# Patient Record
Sex: MALE | Race: WHITE | HISPANIC OR LATINO
[De-identification: names, ages, dates, MRNs, and addresses within clinical notes are randomized per-mention and may not be internally consistent; named-entity substitution may affect disease eponyms.]

---

## 2018-10-29 PROBLEM — Z00.00 ENCOUNTER FOR PREVENTIVE HEALTH EXAMINATION: Status: ACTIVE | Noted: 2018-10-29

## 2018-11-12 ENCOUNTER — APPOINTMENT (OUTPATIENT)
Dept: COLORECTAL SURGERY | Facility: CLINIC | Age: 53
End: 2018-11-12
Payer: COMMERCIAL

## 2018-11-12 VITALS
BODY MASS INDEX: 27.06 KG/M2 | TEMPERATURE: 98.2 F | WEIGHT: 189 LBS | HEART RATE: 68 BPM | DIASTOLIC BLOOD PRESSURE: 83 MMHG | SYSTOLIC BLOOD PRESSURE: 123 MMHG | HEIGHT: 70 IN

## 2018-11-12 PROCEDURE — 46600 DIAGNOSTIC ANOSCOPY SPX: CPT

## 2018-11-12 PROCEDURE — 99213 OFFICE O/P EST LOW 20 MIN: CPT | Mod: 25

## 2018-11-15 LAB
C TRACH RRNA SPEC QL NAA+PROBE: NOT DETECTED
N GONORRHOEA RRNA SPEC QL NAA+PROBE: NOT DETECTED
SOURCE AMPLIFICATION: NORMAL

## 2018-11-19 LAB
CORE LAB NON GYN CYTOLOGY TO LENOX HILL: NORMAL
HHV SPEC CULT: NORMAL

## 2019-11-11 ENCOUNTER — APPOINTMENT (OUTPATIENT)
Dept: COLORECTAL SURGERY | Facility: CLINIC | Age: 54
End: 2019-11-11

## 2019-11-15 ENCOUNTER — APPOINTMENT (OUTPATIENT)
Dept: COLORECTAL SURGERY | Facility: CLINIC | Age: 54
End: 2019-11-15
Payer: COMMERCIAL

## 2019-11-15 VITALS
TEMPERATURE: 97.5 F | DIASTOLIC BLOOD PRESSURE: 76 MMHG | BODY MASS INDEX: 27.63 KG/M2 | HEIGHT: 70 IN | WEIGHT: 193 LBS | HEART RATE: 78 BPM | SYSTOLIC BLOOD PRESSURE: 119 MMHG

## 2019-11-15 DIAGNOSIS — B20 HUMAN IMMUNODEFICIENCY VIRUS [HIV] DISEASE: ICD-10-CM

## 2019-11-15 PROCEDURE — 99213 OFFICE O/P EST LOW 20 MIN: CPT | Mod: 25

## 2019-11-15 PROCEDURE — 46600 DIAGNOSTIC ANOSCOPY SPX: CPT

## 2019-11-15 NOTE — HISTORY OF PRESENT ILLNESS
[FreeTextEntry1] : 53 yo M for follow up anal dysplasia\par \par hx of ASCUS and LSIL pap at [facility], hx of anal chlamydia infection. May have had office based fulguration in the past\par Last seen 11/12/2018, mild/mod int hemorrhoids, herpes/GC/CT swab obtained. Pap LGSIL\par \par \par Reports occasional discomfort from hemorrhoids, however denies at present. \par Denies pain, burning, itching, BPR\par BH: daily, no complaints\par \par HIV (+) on HAART, VL undetectable, CD4 800s. \par MSM, (+) anal receptive sex, denies new sexual partner\par Denies hx of Gardasil vaccine\par \par Colonoscopy recently completed, normal as per patient w/ recommended 5 year follow up\par Denies ASA use, took ibuprofen in the last week for sinus pain

## 2019-11-15 NOTE — PHYSICAL EXAM
[Excoriation] : no perianal excoriation [Normal] : was normal [Fistula] : no fistulas [None] : there was no rectal mass  [de-identified] : Anal pap performed [FreeTextEntry1] : A lighted anoscope was passed into the anal canal and the entire anal mucosal surface was inspected..  THe findings revealed moderate internal hemorrhoids. No masses or lesions were identified.\par \par

## 2019-11-22 LAB — ANAL PAP CYTOLOGY: NORMAL

## 2020-03-06 ENCOUNTER — APPOINTMENT (OUTPATIENT)
Dept: COLORECTAL SURGERY | Facility: CLINIC | Age: 55
End: 2020-03-06
Payer: COMMERCIAL

## 2020-03-06 VITALS
TEMPERATURE: 97.4 F | DIASTOLIC BLOOD PRESSURE: 88 MMHG | HEIGHT: 70 IN | BODY MASS INDEX: 28.35 KG/M2 | SYSTOLIC BLOOD PRESSURE: 139 MMHG | HEART RATE: 89 BPM | WEIGHT: 198 LBS

## 2020-03-06 PROCEDURE — 46600 DIAGNOSTIC ANOSCOPY SPX: CPT

## 2020-03-06 PROCEDURE — 99213 OFFICE O/P EST LOW 20 MIN: CPT | Mod: 25

## 2020-03-06 NOTE — HISTORY OF PRESENT ILLNESS
[FreeTextEntry1] : 55 yo M presents for f/u anal dysplasia\par \par hx of ASCUS and LSIL anal pap at [facility] [place], may have had office based fulguration in the past\par LSIL 11/2018\par Last seen 11/15/2019, hemorrhoids noted, otherwise normal anorectal exam. Anal pap HSIL\par \par Pt presents for 3 month follow up\par Denies pain, itching, BPR or lumps/bumps\par BH: daily, no complaints\par HIV(+) on Descovy/Isentress, VL undetectable, CD4 590s-690s\par Last colonoscopy 11/2019 normal as per patient w/ recommended 5 year follow up\par No ASA use, took Ibuprofen last weekend

## 2020-03-06 NOTE — ASSESSMENT
[FreeTextEntry1] : I have reviewed with the patient the clinical and natural history of human papilloma virus and its relationship to the anus. The risks and associated consequences including sexual transmission, anal warts, anal dysplasia, and the risk of anal cancer have been outlined. The need for long-term surveillance and followup has been detailed. The treatment options including high resolution anoscopy and its associated risk of recurrence and post procedure stricture and pain compared to continued close surveillance and monitoring were reviewed. The patient wishes to proceed with surveillance and management of identified visible/palpable lesions as identified or high grade ( HGSIL) dysplasia identified on cytology.\par \par \par

## 2020-03-06 NOTE — PHYSICAL EXAM
[Excoriation] : no perianal excoriation [Fistula] : no fistulas [Normal] : was normal [None] : there was no rectal mass  [de-identified] : Anal pap performed [FreeTextEntry1] : A lighted anoscope was passed into the anal canal and the entire anal mucosal surface was inspected..  The findings revealed moderate internal hemorrhoids. No masses or lesions were identified.\par \par

## 2020-03-13 LAB — ANAL PAP CYTOLOGY: NORMAL

## 2021-01-08 ENCOUNTER — APPOINTMENT (OUTPATIENT)
Dept: COLORECTAL SURGERY | Facility: CLINIC | Age: 56
End: 2021-01-08

## 2021-02-26 ENCOUNTER — APPOINTMENT (OUTPATIENT)
Dept: COLORECTAL SURGERY | Facility: CLINIC | Age: 56
End: 2021-02-26
Payer: COMMERCIAL

## 2021-02-26 VITALS
HEART RATE: 90 BPM | SYSTOLIC BLOOD PRESSURE: 135 MMHG | TEMPERATURE: 96.9 F | DIASTOLIC BLOOD PRESSURE: 85 MMHG | HEIGHT: 70 IN | BODY MASS INDEX: 28.06 KG/M2 | WEIGHT: 196 LBS

## 2021-02-26 PROCEDURE — 99214 OFFICE O/P EST MOD 30 MIN: CPT | Mod: 25

## 2021-02-26 PROCEDURE — 46600 DIAGNOSTIC ANOSCOPY SPX: CPT

## 2021-02-26 PROCEDURE — 99072 ADDL SUPL MATRL&STAF TM PHE: CPT

## 2021-02-26 NOTE — PHYSICAL EXAM
[Excoriation] : no perianal excoriation [Fistula] : no fistulas [Normal] : was normal [None] : there was no rectal mass  [de-identified] : Anal pap performed [FreeTextEntry1] : A lighted anoscope was passed into the anal canal and the entire anal mucosal surface was inspected..  The findings revealed moderate internal hemorrhoids. No masses or lesions were identified.\par \par

## 2021-02-26 NOTE — HISTORY OF PRESENT ILLNESS
[FreeTextEntry1] : 54 y/o M presents for f/u anal dysplasia\par Last visit was a telehealth visit conducted 4/6/20 for rectal bleeding. Patient was advised conservative management\par Last anal pap smear completed 3/6/20 was rare ASCUS \par \par Underwent surgical resection in December 2020 at [place] for a benign abdominal mass. Patient presented to the ED for abdominal pain, partial resection of the small bowel vs. large intestine\par \par Denies abdominal or rectal pain. Reports sensation of rectal pressure last that is now resolved\par Denies rectal bleeding but admits intermittent itching after BM's. Itching improves when washing the area after BM's\par Denies bumps or lumps\par BH:1-3 time daily\par Denies constipation, diarrhea or straining\par Reports adequate dietary fiber intake. Drinking 3-4 cups of water daily \par \par MSM, (+) anal receptive sex\par HIV (+) on Descovy and Isentress. CD4- 800 , VL- undetectable \par Never received Gardasil vaccine \par Last colonoscopy completed 2019, no abnormal findings per patient\par No ASA/NSAIDs last 7 days

## 2021-02-26 NOTE — ASSESSMENT
[FreeTextEntry1] : I have reviewed with the patient the clinical and natural history of human papilloma virus and its relationship to the anus. The risks and associated consequences including sexual transmission, anal warts, anal dysplasia, and the risk of anal cancer have been outlined. The need for long-term surveillance and followup has been detailed. The treatment options including high resolution anoscopy and its associated risk of recurrence and post procedure stricture and pain compared to continued close surveillance and monitoring were reviewed. The patient wishes to proceed with surveillance and management of identified visible/palpable lesions as identified or high grade ( HGSIL) dysplasia identified on cytology.\par \par \par Obtain old records from recent abdominal surgery.\par

## 2021-03-03 LAB — ANAL PAP CYTOLOGY: NORMAL

## 2021-08-09 ENCOUNTER — APPOINTMENT (OUTPATIENT)
Dept: COLORECTAL SURGERY | Facility: CLINIC | Age: 56
End: 2021-08-09
Payer: COMMERCIAL

## 2021-08-09 VITALS
HEIGHT: 70 IN | WEIGHT: 195 LBS | SYSTOLIC BLOOD PRESSURE: 133 MMHG | DIASTOLIC BLOOD PRESSURE: 86 MMHG | HEART RATE: 74 BPM | TEMPERATURE: 98 F | BODY MASS INDEX: 27.92 KG/M2

## 2021-08-09 PROCEDURE — 99213 OFFICE O/P EST LOW 20 MIN: CPT | Mod: 25

## 2021-08-09 PROCEDURE — 46600 DIAGNOSTIC ANOSCOPY SPX: CPT

## 2021-08-09 NOTE — PHYSICAL EXAM
[Excoriation] : no perianal excoriation [Fistula] : no fistulas [Normal] : was normal [None] : there was no rectal mass  [de-identified] : Anal pap performed [FreeTextEntry1] : Medical assistant was present for the entire exam.\par \par Anoscopy was performed for evaluation of the patients rectal bleeding  history .\par The risks, benefits and alternatives were reviewed.\par \par A lighted anoscope was passed into the anal canal and the entire anal mucosal surface was inspected..  \par The findings revealed moderate internal hemorrhoids.\par No masses or lesions were identified.\par \par

## 2021-08-09 NOTE — HISTORY OF PRESENT ILLNESS
[FreeTextEntry1] : 54 y/o M presents for f/u anal dysplasia\par PSH of diagnostic laparoscopy, ex. lap, drainage of LUQ intraabdominal abscess and en bloc small bowel resection 12/11/20 at [facility], pathology c/w desmoid tumor\par \par \par Last seen in the office on 2/26/21. Moderate internal hemorrhoids noted, no masses or lesions identified. Anal pap smear was LGSIL \par \par Denies abdominal or rectal pain, rectal pain\par Reports severe anorectal itching several weeks ago, evaluated by PCP who performed swabs per patient. Never received results or any medications. Itching has now resolved \par BH:Daily\par Denies constipation or straining. Reports diarrhea earlier in the week \par Reports adequate dietary fiber and water intake \par MSM, (+) anal receptive sex\par HIV (+) on Descovy and Isentress. CD4- 708 , VL- undetectable \par Never received Gardasil vaccine \par Last colonoscopy completed 2019, no abnormal findings per patient\par No ASA/NSAIDs last 7 days

## 2021-08-27 LAB — ANAL PAP CYTOLOGY: NORMAL

## 2021-11-08 ENCOUNTER — APPOINTMENT (OUTPATIENT)
Dept: COLORECTAL SURGERY | Facility: CLINIC | Age: 56
End: 2021-11-08
Payer: COMMERCIAL

## 2021-11-08 VITALS
SYSTOLIC BLOOD PRESSURE: 151 MMHG | TEMPERATURE: 97.6 F | WEIGHT: 202 LBS | HEIGHT: 70 IN | BODY MASS INDEX: 28.92 KG/M2 | HEART RATE: 71 BPM | DIASTOLIC BLOOD PRESSURE: 99 MMHG

## 2021-11-08 PROCEDURE — 46600 DIAGNOSTIC ANOSCOPY SPX: CPT

## 2021-11-08 PROCEDURE — 99214 OFFICE O/P EST MOD 30 MIN: CPT | Mod: 25

## 2021-11-08 NOTE — HISTORY OF PRESENT ILLNESS
[FreeTextEntry1] : 57 yo M presents for f/u anal dysplasia\par PSH of diagnostic laparoscopy, ex. lap, drainage of LUQ intraabdominal abscess and en bloc small bowel resection 12/11/20 at [facility], pathology c/w desmoid tumor\par \par h/o LGSIL anal pap 2/26/21\par Last seen 8/9/21, moderate internal hemorrhoids noted on exam. Anal pap ASCUS cannot exclude HSIL\par \par Pt presents for routine follow up, he denies complaints\par \par BH: twice daily, no complaints\par Denies constipation, diarrhea or straining\par MSM, (+) anal receptive sex\par HIV (+) on Descovy and Isentress. CD4- 500 , VL- undetectable \par Never received Gardasil vaccine\par \par Last colonoscopy completed 2019, no abnormal findings per patient\par No ASA use. Took ibuprofen yesterday for sinus pain\par

## 2021-11-08 NOTE — PHYSICAL EXAM
[Excoriation] : no perianal excoriation [Fistula] : no fistulas [Normal] : was normal [None] : there was no rectal mass  [de-identified] : Anal pap performed [FreeTextEntry1] : Medical assistant was present for the entire exam.\par \par Anoscopy was performed for evaluation of the patients rectal bleeding  history .\par The risks, benefits and alternatives were reviewed.\par \par A lighted anoscope was passed into the anal canal and the entire anal mucosal surface was inspected..  \par The findings revealed moderate internal hemorrhoids.\par No masses or lesions were identified.\par \par

## 2022-05-23 ENCOUNTER — LABORATORY RESULT (OUTPATIENT)
Age: 57
End: 2022-05-23

## 2022-05-23 ENCOUNTER — NON-APPOINTMENT (OUTPATIENT)
Age: 57
End: 2022-05-23

## 2022-05-23 ENCOUNTER — APPOINTMENT (OUTPATIENT)
Dept: COLORECTAL SURGERY | Facility: CLINIC | Age: 57
End: 2022-05-23
Payer: COMMERCIAL

## 2022-05-23 VITALS
TEMPERATURE: 98 F | BODY MASS INDEX: 29.35 KG/M2 | WEIGHT: 205 LBS | HEIGHT: 70 IN | SYSTOLIC BLOOD PRESSURE: 134 MMHG | DIASTOLIC BLOOD PRESSURE: 87 MMHG | HEART RATE: 80 BPM

## 2022-05-23 PROCEDURE — 99214 OFFICE O/P EST MOD 30 MIN: CPT | Mod: 25

## 2022-05-23 PROCEDURE — 46600 DIAGNOSTIC ANOSCOPY SPX: CPT

## 2022-05-23 NOTE — PHYSICAL EXAM
[Excoriation] : no perianal excoriation [Fistula] : no fistulas [Normal] : was normal [None] : there was no rectal mass  [de-identified] : Anal pap performed [FreeTextEntry1] : Medical assistant was present for the entire exam.\par \par Anoscopy was performed for evaluation of the patients rectal bleeding  history .\par The risks, benefits and alternatives were reviewed.\par \par A lighted anoscope was passed into the anal canal and the entire anal mucosal surface was inspected..  \par The findings revealed moderate internal hemorrhoids.\par No masses or lesions were identified.\par \par

## 2022-05-23 NOTE — HISTORY OF PRESENT ILLNESS
[FreeTextEntry1] : 57 y/o M presents for f/u anal dysplasia \par PSH of diagnostic laparoscopy, Ex lap, drainage of LUQ intraabdominal abscess and block small bowel resection 12/11/20 at [facility], pathology c/w desmoid tumor \par \par H/o LGSIL anal pap  2/26/21\par \par Anal Pap ASCUS cannot exclude HSIL 8/9/21. \par \par Last seen in the office 11/8/21: On rectal exam, anal pap performed, no perianal excoriation and no fistulas. The sphincter tone was normal. Moderate internal hemorrhoids on anoscopy. Discussed finidngs and treatment options, will continue to monitor closely pt for HGSIL.  \par \par Anal PAP 11/8/21: ASCUS\par \par \par Pt due for anal pap today.\par \par Pt reports occasional perianal itching, resolves with use of bedet. \par \par \par Pt denies lumps/bumps, discharge, BRBPR. \par \par BH: 2-3x daily, no straining\par Adequate fiber and water intake. \par \par Denies constipation, diarrhea or straining\par MSM, (+) anal receptive sex\par HIV (+) on Descovy and Isentress. CD4- 700s , VL- undetectable - 4/2022\par Never received Gardasil vaccine\par \par Last colonoscopy completed 2019, no abnormal findings per patient. Due for repeat in 2024. \par \par

## 2022-05-27 ENCOUNTER — NON-APPOINTMENT (OUTPATIENT)
Age: 57
End: 2022-05-27

## 2022-05-27 LAB — ANAL PAP CYTOLOGY: NORMAL

## 2022-05-31 ENCOUNTER — NON-APPOINTMENT (OUTPATIENT)
Age: 57
End: 2022-05-31

## 2022-08-24 DIAGNOSIS — K64.8 OTHER HEMORRHOIDS: ICD-10-CM

## 2022-08-24 DIAGNOSIS — K62.5 HEMORRHAGE OF ANUS AND RECTUM: ICD-10-CM

## 2022-08-24 RX ORDER — HYDROCORTISONE 25 MG/G
2.5 CREAM TOPICAL
Qty: 30 | Refills: 0 | Status: ACTIVE | COMMUNITY
Start: 2022-08-24 | End: 1900-01-01

## 2022-10-03 ENCOUNTER — APPOINTMENT (OUTPATIENT)
Dept: COLORECTAL SURGERY | Facility: CLINIC | Age: 57
End: 2022-10-03

## 2022-11-11 ENCOUNTER — APPOINTMENT (OUTPATIENT)
Dept: COLORECTAL SURGERY | Facility: CLINIC | Age: 57
End: 2022-11-11

## 2023-02-03 ENCOUNTER — APPOINTMENT (OUTPATIENT)
Dept: COLORECTAL SURGERY | Facility: CLINIC | Age: 58
End: 2023-02-03
Payer: COMMERCIAL

## 2023-02-03 VITALS
BODY MASS INDEX: 29.63 KG/M2 | WEIGHT: 207 LBS | HEIGHT: 70 IN | SYSTOLIC BLOOD PRESSURE: 144 MMHG | TEMPERATURE: 98.1 F | HEART RATE: 76 BPM | DIASTOLIC BLOOD PRESSURE: 89 MMHG

## 2023-02-03 DIAGNOSIS — L29.0 PRURITUS ANI: ICD-10-CM

## 2023-02-03 PROCEDURE — 99213 OFFICE O/P EST LOW 20 MIN: CPT | Mod: 25

## 2023-02-03 PROCEDURE — 46600 DIAGNOSTIC ANOSCOPY SPX: CPT

## 2023-02-03 NOTE — PHYSICAL EXAM
[Excoriation] : excoriations [Fistula] : no fistulas [Normal] : was normal [None] : there was no rectal mass  [de-identified] : Anal pap performed [de-identified] : Mild superficial fissuring of the right anterior anal margin [FreeTextEntry1] : Medical assistant was present for the entire exam.\par \par Anoscopy was performed for evaluation of the patients rectal bleeding  history .\par The risks, benefits and alternatives were reviewed.\par \par A lighted anoscope was passed into the anal canal and the entire anal mucosal surface was inspected..  \par The findings revealed moderate internal hemorrhoids.\par No masses or lesions were identified.\par \par

## 2023-02-03 NOTE — ASSESSMENT
[FreeTextEntry1] : I have reviewed with the patient the clinical and natural history of human papilloma virus and its relationship to the anus. The risks and associated consequences including sexual transmission, anal warts, anal dysplasia, and the risk of anal cancer have been outlined. The need for long-term surveillance and followup has been detailed. The treatment options including high resolution anoscopy and its associated risk of recurrence and post procedure stricture and pain compared to continued close surveillance and monitoring were reviewed. The patient wishes to proceed with surveillance and management of identified visible/palpable lesions as identified or high grade ( HGSIL) dysplasia identified on cytology.\par \par \par \par Advised the patient of the etiology and treatment of pruritus ani.  Recommendations including appropriate perianal hygiene changes, avoidance of soaps and astringents, lubricating lotions and avoidance of excessive wiping detailed.\par \par Advised increasing fiber regimen.\par \par \par

## 2023-02-03 NOTE — HISTORY OF PRESENT ILLNESS
[FreeTextEntry1] : 58 y/o M presents for follow up of anal dysplasia \par \par MSM, (+) anal receptive sex\par HIV (+) on Descovy and Isentress. CD4- 618 , VL- undetectable - 1/2023\par Never received Gardasil vaccine\par \par PSH of diagnostic laparoscopy Ex lap, drainage of LUQ intraabdominal abscess and block small bowel resection 12/11/20 at [facility], pathology c/w desmoid tumor \par Last colonoscopy completed 2019, no abnormal findings per patient. Due for repeat in 2024. \par \par H/o LGSIL anal pap 2/26/21 followed by ASCUS cannot exclude HSIL (8/9/21), ASCUS 11/2021\par \par Most recently seen in f/u 5/23/22 ,anal pap performed. Exam including anoscopy revealed, no perianal skin excoriations and no fistulas. Sphincter tone was normal. Moderate internal hemorrhoids.  \par Anal pap: Atypical squamous cells of undetermined significance (ASC-US)\par \par In interim, pt contacted this office c/o anal itching and bleeding. He meant to contact PCP office. MICAH Freitas reviewed hygiene and diet and prescribed HC cream in the meantime and advised pt to follow up w/ PCP for routine STI testing. Pt used HC cream and it helped at the time.\par \par In November 2022 pt returned to home country ([place] as his mother passed away. He had constipation and felt anal pain afterwards which resolved after a couple hours.\par \par Admits to occasional itching which improves w/ cleaning. He admits to occasionally scratching the area and is not using any topical creams\par \par Of note, pt had [place] [facility] for f/u small bowel desmoid tumor\par Impression: \par s/p partial jejunal resection w/ anastomosis w/ mild dilatation at anastomotic site w/o discrete mass or recurrence demonstrated\par Denies ASA/NSAID use\par \par \par

## 2023-03-03 LAB — ANAL PAP CYTOLOGY: NORMAL

## 2023-05-20 ENCOUNTER — NON-APPOINTMENT (OUTPATIENT)
Age: 58
End: 2023-05-20

## 2023-08-28 ENCOUNTER — APPOINTMENT (OUTPATIENT)
Dept: COLORECTAL SURGERY | Facility: CLINIC | Age: 58
End: 2023-08-28
Payer: COMMERCIAL

## 2023-08-28 ENCOUNTER — NON-APPOINTMENT (OUTPATIENT)
Age: 58
End: 2023-08-28

## 2023-08-28 VITALS
HEIGHT: 70 IN | WEIGHT: 212 LBS | HEART RATE: 75 BPM | BODY MASS INDEX: 30.35 KG/M2 | SYSTOLIC BLOOD PRESSURE: 126 MMHG | DIASTOLIC BLOOD PRESSURE: 82 MMHG | TEMPERATURE: 98 F

## 2023-08-28 PROCEDURE — 46600 DIAGNOSTIC ANOSCOPY SPX: CPT

## 2023-08-28 PROCEDURE — 99213 OFFICE O/P EST LOW 20 MIN: CPT | Mod: 25

## 2023-08-28 NOTE — HISTORY OF PRESENT ILLNESS
[FreeTextEntry1] : 58 y/o M presents for follow up evaluation of anal dysplasia   MSM, (+) anal receptive sex HIV (+) on Descovy and Isentress. CD4- 618 , VL- undetectable - 1/2023 Never received Gardasil vaccine  PSH of diagnostic laparoscopy Ex lap, drainage of LUQ intraabdominal abscess and block small bowel resection 12/11/20 at [facility], pathology c/w desmoid tumor  Last colonoscopy completed 2019, no abnormal findings per patient. Due for repeat in 2024.  H/o LGSIL anal pap 2/26/21 followed by ASCUS cannot exclude HSIL (8/9/21), ASCUS 11/2021  Seen 5/2022, anal pap ASCUS  Of note, [place] [facility] [place] for f/u small bowel desmoid tumor Impression: s/p partial jejunal resection w/ anastomosis w/ mild dilatation at anastomotic site w/o discrete mass or recurrence demonstrated  Last seen 2/3/2023, c/o itching. excoriations and mild superficial fissuring of right anterior anal margin, no fistula. Moderate internal hemorrhoids noted. Anal pap LSIL  Pt presents for 6 month follow up Reports h/o 2-3 instances of sharp pain in anal area while sitting or laying down. Last occurred 2 months ago. Denies rectal pain, itching or bleeding Moving bowels regularly Denies ASA/NSAID use

## 2023-08-28 NOTE — PHYSICAL EXAM
[Excoriation] : no perianal excoriation [Fistula] : no fistulas [Normal] : was normal [None] : there was no rectal mass  [de-identified] : Anal pap performed [FreeTextEntry1] : Medical assistant was present for the entire exam.\par  \par  Anoscopy was performed for evaluation of the patients rectal bleeding  history .\par  The risks, benefits and alternatives were reviewed.\par  \par  A lighted anoscope was passed into the anal canal and the entire anal mucosal surface was inspected..  \par  The findings revealed moderate internal hemorrhoids.\par  No masses or lesions were identified.\par  \par

## 2023-08-28 NOTE — ASSESSMENT
[FreeTextEntry1] : I have reviewed with the patient the clinical and natural history of human papilloma virus and its relationship to the anus. The risks and associated consequences including sexual transmission, anal warts, anal dysplasia, and the risk of anal cancer have been outlined. The need for long-term surveillance and followup has been detailed. The treatment options including high resolution anoscopy and its associated risk of recurrence and post procedure stricture and pain compared to continued close surveillance and monitoring were reviewed. The patient wishes to proceed with surveillance and management of identified visible/palpable lesions as identified or high grade ( HGSIL) dysplasia identified on cytology.

## 2023-09-22 ENCOUNTER — NON-APPOINTMENT (OUTPATIENT)
Age: 58
End: 2023-09-22

## 2023-09-25 ENCOUNTER — NON-APPOINTMENT (OUTPATIENT)
Age: 58
End: 2023-09-25

## 2023-09-25 LAB — ANAL PAP CYTOLOGY: NORMAL

## 2024-03-04 ENCOUNTER — LABORATORY RESULT (OUTPATIENT)
Age: 59
End: 2024-03-04

## 2024-03-04 ENCOUNTER — APPOINTMENT (OUTPATIENT)
Dept: COLORECTAL SURGERY | Facility: CLINIC | Age: 59
End: 2024-03-04
Payer: COMMERCIAL

## 2024-03-04 VITALS
HEIGHT: 70 IN | SYSTOLIC BLOOD PRESSURE: 119 MMHG | BODY MASS INDEX: 31.77 KG/M2 | WEIGHT: 221.9 LBS | TEMPERATURE: 97.4 F | OXYGEN SATURATION: 97 % | HEART RATE: 63 BPM | DIASTOLIC BLOOD PRESSURE: 82 MMHG

## 2024-03-04 DIAGNOSIS — K62.82 DYSPLASIA OF ANUS: ICD-10-CM

## 2024-03-04 PROCEDURE — 46600 DIAGNOSTIC ANOSCOPY SPX: CPT

## 2024-03-04 PROCEDURE — 99213 OFFICE O/P EST LOW 20 MIN: CPT | Mod: 25

## 2024-03-04 RX ORDER — RALTEGRAVIR 400 MG/1
400 TABLET, FILM COATED ORAL
Refills: 0 | Status: DISCONTINUED | COMMUNITY
End: 2024-03-01

## 2024-03-04 RX ORDER — BICTEGRAVIR SODIUM, EMTRICITABINE, AND TENOFOVIR ALAFENAMIDE FUMARATE 50; 200; 25 MG/1; MG/1; MG/1
50-200-25 TABLET ORAL
Refills: 0 | Status: ACTIVE | COMMUNITY

## 2024-03-04 RX ORDER — EMTRICITABINE AND TENOFOVIR ALAFENAMIDE 120; 15 MG/1; MG/1
TABLET ORAL
Refills: 0 | Status: DISCONTINUED | COMMUNITY
End: 2024-03-01

## 2024-03-04 RX ORDER — LOSARTAN POTASSIUM 50 MG/1
50 TABLET, FILM COATED ORAL
Refills: 0 | Status: ACTIVE | COMMUNITY

## 2024-03-04 NOTE — PHYSICAL EXAM
[Excoriation] : no perianal excoriation [Fistula] : no fistulas [Normal] : was normal [None] : there was no rectal mass  [de-identified] : Anal pap performed [FreeTextEntry1] : Medical assistant was present for the entire exam.\par  \par  Anoscopy was performed for evaluation of the patients rectal bleeding  history .\par  The risks, benefits and alternatives were reviewed.\par  \par  A lighted anoscope was passed into the anal canal and the entire anal mucosal surface was inspected..  \par  The findings revealed moderate internal hemorrhoids.\par  No masses or lesions were identified.\par  \par

## 2024-03-04 NOTE — HISTORY OF PRESENT ILLNESS
[FreeTextEntry1] : 57 y/o M presents for follow up evaluation of anal dysplasia   MSM, (+) anal receptive sex HIV (+), recently switched to Biktarvy (2/2024). CD4 500s, VL undetectable Never received Gardasil vaccine  PSH of diagnostic laparoscopy Ex lap, drainage of LUQ intraabdominal abscess and block small bowel resection 12/11/20 at [facility], pathology c/w desmoid tumor  Last colonoscopy completed 102019 w/ Dr. David Marie, non bleeding hemorrhoids noted, recommended  repeat in 2024.  H/o LGSIL anal pap 2/26/21 followed by ASCUS cannot exclude HSIL (8/9/21), ASCUS 11/2021  Seen 5/2022, anal pap ASCUS  Of note, [place] [facility] f/u small bowel desmoid tumor Impression: s/p partial jejunal resection w/ anastomosis w/ mild dilatation at anastomotic site w/o discrete mass or recurrence demonstrated  Seen 2/3/2023, c/o itching. Excoriations and mild superficial fissuring of right anterior anal margin, no fistula. Moderate internal hemorrhoids noted. Anal pap LSIL  Most recently seen 8/28/23, Patient reports h/o 2-3 instances of sharp pain in anal area while sitting or laying down. Anal pap performed. On exam, moderate internal hemorrhoids.   Anal pap: LSIL  Pt presents for 6 month follow up exam Reports he was recently started on Losartan and now taking Biktarvy instead of Descovy/Isentress.  Denies anorectal complaints Moving bowels regularly w/o issue.  Denies ASA/NSAID use

## 2024-03-13 ENCOUNTER — NON-APPOINTMENT (OUTPATIENT)
Age: 59
End: 2024-03-13

## 2024-03-13 LAB — ANAL PAP CYTOLOGY: NORMAL

## 2025-03-17 ENCOUNTER — NON-APPOINTMENT (OUTPATIENT)
Age: 60
End: 2025-03-17

## 2025-03-17 ENCOUNTER — APPOINTMENT (OUTPATIENT)
Dept: COLORECTAL SURGERY | Facility: CLINIC | Age: 60
End: 2025-03-17
Payer: COMMERCIAL

## 2025-03-17 ENCOUNTER — LABORATORY RESULT (OUTPATIENT)
Age: 60
End: 2025-03-17

## 2025-03-17 VITALS
HEIGHT: 70 IN | TEMPERATURE: 97.1 F | WEIGHT: 183 LBS | BODY MASS INDEX: 26.2 KG/M2 | SYSTOLIC BLOOD PRESSURE: 114 MMHG | HEART RATE: 71 BPM | DIASTOLIC BLOOD PRESSURE: 72 MMHG

## 2025-03-17 DIAGNOSIS — K62.82 DYSPLASIA OF ANUS: ICD-10-CM

## 2025-03-17 PROCEDURE — 99213 OFFICE O/P EST LOW 20 MIN: CPT | Mod: 25

## 2025-03-17 PROCEDURE — 46600 DIAGNOSTIC ANOSCOPY SPX: CPT

## 2025-03-26 ENCOUNTER — NON-APPOINTMENT (OUTPATIENT)
Age: 60
End: 2025-03-26

## 2025-03-26 LAB — ANAL PAP CYTOLOGY: NORMAL
